# Patient Record
(demographics unavailable — no encounter records)

---

## 2017-02-10 DIAGNOSIS — I10 ESSENTIAL HYPERTENSION: ICD-10-CM

## 2017-02-10 RX ORDER — METOPROLOL TARTRATE 25 MG/1
25 TABLET, FILM COATED ORAL 2 TIMES DAILY
Qty: 60 TABLET | Refills: 11 | Status: SHIPPED | OUTPATIENT
Start: 2017-02-10 | End: 2018-02-10

## 2017-02-10 NOTE — TELEPHONE ENCOUNTER
----- Message from Tiny Zaragoza sent at 2/10/2017 10:51 AM CST -----  Contact: [facility]  Patient needs a refill on metoporlol called into [facility].  Please call pharmacy at [phone], if you have any questions. Thanks!